# Patient Record
Sex: FEMALE | Race: ASIAN | NOT HISPANIC OR LATINO
[De-identification: names, ages, dates, MRNs, and addresses within clinical notes are randomized per-mention and may not be internally consistent; named-entity substitution may affect disease eponyms.]

---

## 2017-07-19 ENCOUNTER — APPOINTMENT (OUTPATIENT)
Dept: ORTHOPEDIC SURGERY | Facility: CLINIC | Age: 57
End: 2017-07-19

## 2017-07-19 VITALS
BODY MASS INDEX: 24.27 KG/M2 | SYSTOLIC BLOOD PRESSURE: 146 MMHG | HEART RATE: 51 BPM | WEIGHT: 151 LBS | DIASTOLIC BLOOD PRESSURE: 95 MMHG | HEIGHT: 66 IN

## 2017-07-19 DIAGNOSIS — M23.91 UNSPECIFIED INTERNAL DERANGEMENT OF RIGHT KNEE: ICD-10-CM

## 2017-07-19 DIAGNOSIS — M22.2X2 PATELLOFEMORAL DISORDERS, RIGHT KNEE: ICD-10-CM

## 2017-07-19 DIAGNOSIS — M22.2X1 PATELLOFEMORAL DISORDERS, RIGHT KNEE: ICD-10-CM

## 2017-07-19 RX ORDER — LOPERAMIDE HCL 2 MG
CAPSULE ORAL
Refills: 0 | Status: ACTIVE | COMMUNITY

## 2017-07-19 RX ORDER — FAMOTIDINE 40 MG/1
TABLET, FILM COATED ORAL
Refills: 0 | Status: ACTIVE | COMMUNITY

## 2017-07-19 RX ORDER — ASCORBIC ACID 500 MG
TABLET ORAL
Refills: 0 | Status: ACTIVE | COMMUNITY

## 2019-08-29 ENCOUNTER — APPOINTMENT (OUTPATIENT)
Dept: OBGYN | Facility: CLINIC | Age: 59
End: 2019-08-29
Payer: COMMERCIAL

## 2019-08-29 ENCOUNTER — RESULT REVIEW (OUTPATIENT)
Age: 59
End: 2019-08-29

## 2019-08-29 PROCEDURE — 99386 PREV VISIT NEW AGE 40-64: CPT

## 2019-10-05 ENCOUNTER — APPOINTMENT (OUTPATIENT)
Dept: ULTRASOUND IMAGING | Facility: CLINIC | Age: 59
End: 2019-10-05
Payer: COMMERCIAL

## 2019-10-05 ENCOUNTER — OUTPATIENT (OUTPATIENT)
Dept: OUTPATIENT SERVICES | Facility: HOSPITAL | Age: 59
LOS: 1 days | End: 2019-10-05
Payer: COMMERCIAL

## 2019-10-05 ENCOUNTER — APPOINTMENT (OUTPATIENT)
Dept: MAMMOGRAPHY | Facility: CLINIC | Age: 59
End: 2019-10-05
Payer: COMMERCIAL

## 2019-10-05 DIAGNOSIS — Z00.8 ENCOUNTER FOR OTHER GENERAL EXAMINATION: ICD-10-CM

## 2019-10-05 PROCEDURE — 76641 ULTRASOUND BREAST COMPLETE: CPT

## 2019-10-05 PROCEDURE — 77063 BREAST TOMOSYNTHESIS BI: CPT

## 2019-10-05 PROCEDURE — 77067 SCR MAMMO BI INCL CAD: CPT | Mod: 26

## 2019-10-05 PROCEDURE — 76641 ULTRASOUND BREAST COMPLETE: CPT | Mod: 26,LT

## 2019-10-05 PROCEDURE — 77067 SCR MAMMO BI INCL CAD: CPT

## 2019-10-05 PROCEDURE — 77063 BREAST TOMOSYNTHESIS BI: CPT | Mod: 26

## 2019-10-05 PROCEDURE — 76641 ULTRASOUND BREAST COMPLETE: CPT | Mod: 26,RT

## 2019-12-28 ENCOUNTER — OUTPATIENT (OUTPATIENT)
Dept: OUTPATIENT SERVICES | Facility: HOSPITAL | Age: 59
LOS: 1 days | End: 2019-12-28
Payer: COMMERCIAL

## 2019-12-28 ENCOUNTER — APPOINTMENT (OUTPATIENT)
Dept: CT IMAGING | Facility: CLINIC | Age: 59
End: 2019-12-28
Payer: COMMERCIAL

## 2019-12-28 DIAGNOSIS — Z00.8 ENCOUNTER FOR OTHER GENERAL EXAMINATION: ICD-10-CM

## 2019-12-28 PROCEDURE — 70491 CT SOFT TISSUE NECK W/DYE: CPT | Mod: 26

## 2019-12-28 PROCEDURE — 70491 CT SOFT TISSUE NECK W/DYE: CPT

## 2020-01-18 ENCOUNTER — APPOINTMENT (OUTPATIENT)
Dept: MRI IMAGING | Facility: CLINIC | Age: 60
End: 2020-01-18

## 2020-02-15 ENCOUNTER — APPOINTMENT (OUTPATIENT)
Dept: MRI IMAGING | Facility: HOSPITAL | Age: 60
End: 2020-02-15

## 2021-01-06 ENCOUNTER — TRANSCRIPTION ENCOUNTER (OUTPATIENT)
Age: 61
End: 2021-01-06

## 2021-02-05 ENCOUNTER — TRANSCRIPTION ENCOUNTER (OUTPATIENT)
Age: 61
End: 2021-02-05

## 2021-05-14 ENCOUNTER — APPOINTMENT (OUTPATIENT)
Dept: ORTHOPEDIC SURGERY | Facility: CLINIC | Age: 61
End: 2021-05-14
Payer: COMMERCIAL

## 2021-05-14 VITALS
HEART RATE: 52 BPM | SYSTOLIC BLOOD PRESSURE: 114 MMHG | BODY MASS INDEX: 25.18 KG/M2 | WEIGHT: 156 LBS | DIASTOLIC BLOOD PRESSURE: 76 MMHG | TEMPERATURE: 97.2 F

## 2021-05-14 DIAGNOSIS — Z86.69 PERSONAL HISTORY OF OTHER DISEASES OF THE NERVOUS SYSTEM AND SENSE ORGANS: ICD-10-CM

## 2021-05-14 DIAGNOSIS — Z80.1 FAMILY HISTORY OF MALIGNANT NEOPLASM OF TRACHEA, BRONCHUS AND LUNG: ICD-10-CM

## 2021-05-14 DIAGNOSIS — Z80.41 FAMILY HISTORY OF MALIGNANT NEOPLASM OF OVARY: ICD-10-CM

## 2021-05-14 DIAGNOSIS — Z87.39 PERSONAL HISTORY OF OTHER DISEASES OF THE MUSCULOSKELETAL SYSTEM AND CONNECTIVE TISSUE: ICD-10-CM

## 2021-05-14 PROCEDURE — 72050 X-RAY EXAM NECK SPINE 4/5VWS: CPT

## 2021-05-14 PROCEDURE — 73030 X-RAY EXAM OF SHOULDER: CPT | Mod: RT

## 2021-05-14 PROCEDURE — 20610 DRAIN/INJ JOINT/BURSA W/O US: CPT | Mod: RT

## 2021-05-14 PROCEDURE — S0020: CPT

## 2021-05-14 PROCEDURE — 99204 OFFICE O/P NEW MOD 45 MIN: CPT | Mod: 25

## 2021-05-14 PROCEDURE — 99072 ADDL SUPL MATRL&STAF TM PHE: CPT

## 2021-05-14 RX ORDER — BUPIVACAINE HCL/PF 2.5 MG/ML
0.25 VIAL (ML) INJECTION
Qty: 0 | Refills: 0 | Status: COMPLETED | OUTPATIENT
Start: 2021-05-14

## 2021-05-14 RX ORDER — METHYLPRED ACET/NACL,ISO-OS/PF 40 MG/ML
40 VIAL (ML) INJECTION
Qty: 1 | Refills: 0 | Status: COMPLETED | OUTPATIENT
Start: 2021-05-14

## 2021-05-14 RX ORDER — OMEPRAZOLE 40 MG/1
40 CAPSULE, DELAYED RELEASE ORAL
Refills: 0 | Status: ACTIVE | COMMUNITY

## 2021-05-14 RX ORDER — MONTELUKAST 10 MG/1
10 TABLET, FILM COATED ORAL
Refills: 0 | Status: ACTIVE | COMMUNITY

## 2021-05-14 RX ORDER — LIDOCAINE HYDROCHLORIDE 10 MG/ML
1 INJECTION, SOLUTION INFILTRATION; PERINEURAL
Qty: 0 | Refills: 0 | Status: COMPLETED | OUTPATIENT
Start: 2021-05-14

## 2021-05-14 RX ADMIN — LIDOCAINE HYDROCHLORIDE 3 %: 10 INJECTION, SOLUTION INFILTRATION; PERINEURAL at 00:00

## 2021-05-14 RX ADMIN — BUPIVACAINE HYDROCHLORIDE 3 %: 2.5 INJECTION, SOLUTION INFILTRATION; PERINEURAL at 00:00

## 2021-05-14 RX ADMIN — METHYLPREDNISOLONE ACETATE 1 MG/ML: 40 INJECTION, SUSPENSION INTRALESIONAL; INTRAMUSCULAR; INTRASYNOVIAL; SOFT TISSUE at 00:00

## 2021-05-19 NOTE — PHYSICAL EXAM
[Normal] : normal [UE] : Sensory: Intact in bilateral upper extremities [Bicep] : biceps 2+ and symmetric bilaterally [B.R.] : biceps 2+ and symmetric bilaterally [Tricep] : triceps 2+ and symmetric bilaterally [Rad] : radial 2+ and symmetric bilaterally [de-identified] : 4 views cervical spine demonstrate no significant scoliosis.  Normal cervical lordosis with some straightening of the mid cervical spine.  No dynamic instability between flexion-extension.  Minimal loss of disc height posteriorly at the C5-6 and C6-7 levels.  No acute fractures.\par \par 3 views of the right shoulder demonstrate preserved glenohumeral joint.  Type I acromion.  No significant degenerative changes.  No acute fractures.\par ______\par EXAM: CT NECK SOFT TISSUE IC \par \par PROCEDURE DATE: 12/28/2019 \par \par INTERPRETATION: INDICATIONS: Dysphagia, first to liquids over the years, \par progressive to both liquids and solids, of looping vessel left-sided throat \par during swallowing, colon on tongue during swallowing, feels tightness during \par speaking \par \par TECHNIQUE: Axial images were obtained following the intravenous \par administration of contrast material. Sagittal and coronal reformatted images \par were obtained. 90 cc Omnipaque 350 were administered. 10 cc were discarded. \par \par COMPARISON EXAMINATION: None. \par \par FINDINGS: \par \par AERODIGESTIVE TRACT AND THYROID GLAND: \par \par A radiographic skin marker at the left inferior neck at C4-5, medially there \par is a slightly heterogeneous left thyroid lobe, (3:142), ultrasound may \par better assess. Just slightly superiorly and posterior, there is also a \par posterior displaced left greater than right hyoid bone, on the left \par approximating the anterior left longus coli musculature, (3:114). \par \par Mildly prominent left greater the right lingual tonsils with asymmetric \par extension into the vallecula abutting the left ventral epiglottis, (3:115), \par correlate with visual inspection, aryepiglottic folds, piriform sinuses,, \par false and true cords are unremarkable. A swallow study may better assess \par dysphagia in this patient. \par \par LYMPH NODES: No adenopathy. \par \par PAROTID GLANDS: Unremarkable. \par \par SUBMANDIBULAR GLANDS: Unremarkable. \par \par VISUALIZED PARANASAL SINUSES: Clear. \par \par VISUALIZED TYMPANOMASTOID CAVITIES: Clear. \par \par BONES: Unremarkable alignment with mild spondylitic changes in cerebral \par spine, no evidence for any severe canal compromise. \par \par MISCELLANEOUS: Biapical parenchymal scarring, (3:199). Brain parenchyma is \par unremarkable, no acute hemorrhage or midline shift. Orbits unremarkable, \par contrast enhancement is in the carotid arteries and jugular veins indicative \par of patency. \par \par IMPRESSION: \par \par Marker at C4-5 left neck, heterogeneous left thyroid, ultrasound may better \par assess. Posterior displaced left hyoid bone, prominent left lingual tonsils \par extending to the vallecula, a swallow study may better assess dysphagia \par \par SAMINA NUNEZ M.D., RADIOLOGY FELLOW \par This document has been electronically signed. \par HARSH PEARCE M.D., ATTENDING RADIOLOGIST \par This document has been electronically signed. Dec 31 2019 11:49AM  [Poor Appearance] : well-appearing [Acute Distress] : not in acute distress [Obese] : not obese [Disorientation] : oriented x 3 [Oglesby's Sign] : negative Oglesby's sign [SLR] : negative straight leg raise [FreeTextEntry2] : The pt is awake, alert and oriented to self, place and time, is comfortable and in no acute distress. Gait evaluation reveals a narrow based, non-ataxic, non-antalgic gait. Negative Romberg sign noted. Can heel and toe walk without difficulty. Inspection of the neck, back and upper extremities bilaterally reveals no rashes or ecchymotic lesions. There is no obvious abnormal spinal curvature in the sagittal and coronal planes. \par Significant stiffness is noted with left shoulder active and passive range of motion. Forward elevation is to 90° external rotation is 25° and internal rotation is to the sacrum. On the right side forward elevation is 160°, external rotation is 60° and internal rotation is to T12. Unable to assess for Neers or Cee sign on the left. No crepitus or instability noted with range of motion of bilateral upper extremities. No joint laxity noted in the upper and lower extremities bilaterally. No atrophy or abnormal movements noted in the upper or lower extremities. No tenderness over the cervical, thoracic, lumbar spine or in the paraspinal, or upper and lower extremity musculature. There is no swelling noted in the upper or lower extremities bilaterally. No cervical lymphadenopathy noted anteriorly.\par Cervical spine range of motion is pain free. Forward flexion is neck to chin, extension 30 degrees, rotation laterally 40 degrees bilaterally. Negative Spurling's sign bilaterally.\par 5/5 motor strength at the deltoids, biceps, triceps, wrist extensors, flexors and hand intrinsics bilaterally. No gross sensory deficits to light touch bilaterally. Reflexes are symmetric +2 at biceps, triceps, and brachioradialis bilaterally. There are 2+ radial pulses bilaterally. Negative Babinski sign and no clonus bilaterally in the upper or lower extremities. [de-identified] : Positive Neer and Cee sign on the right

## 2021-05-19 NOTE — HISTORY OF PRESENT ILLNESS
[Worsening] : worsening [7] : a current pain level of 7/10 [Constant] : ~He/She~ states the symptoms seem to be constant [de-identified] : Patient presents today for right shoulder pain.  Patient states she has had the pain for the past 2 years but worsening x 6 months.  Has not had any treatment due to covid 19 fear.  Patient also complains of neck pain on both sides and states she does have some pain to left shoulder as well but is only here for right shoulder.  Denies numbness and tingling at this time.  No weakness.  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 [de-identified] : laying on right side, movement

## 2021-05-19 NOTE — DISCUSSION/SUMMARY
[Medication Risks Reviewed] : Medication risks reviewed [de-identified] : The patient has symptoms suggestive of rotator cuff tendinitis.  Offered her an injection of subacromial corticosteroids and she wanted to proceed.  Under sterile conditions 40 mg of Depo-Medrol mixed with a 6 cc solution of 1% lidocaine 0.25% Marcaine without epinephrine was injected into the right shoulder subacromial space with more than 80% relief of symptoms 5 minutes after the injection.  To assess for labral tear given her clicking and symptomatic pathology in the right shoulder recommended MR arthrogram as well.  Prescribed her physical therapy.  Further treatment options can be discussed based on response to the injection and MRI findings.\par \par The patient was educated regarding their condition, treatment options as well as prescribed course of treatment. \par Risks and benefits as well as alternatives to the proposed treatment were also provided to the patient \par They were given the opportunity to have all their questions answered to their satisfaction.\par \par Vital signs were reviewed with the patient and the patient was instructed to followup with their primary care provider for further management.\par \par Healthy lifestyle recommendations were also made including a tobacco free lifestyle, proper diet, and weight control.

## 2021-06-08 ENCOUNTER — APPOINTMENT (OUTPATIENT)
Dept: MRI IMAGING | Facility: CLINIC | Age: 61
End: 2021-06-08
Payer: COMMERCIAL

## 2021-06-08 ENCOUNTER — NON-APPOINTMENT (OUTPATIENT)
Age: 61
End: 2021-06-08

## 2021-06-08 ENCOUNTER — OUTPATIENT (OUTPATIENT)
Dept: OUTPATIENT SERVICES | Facility: HOSPITAL | Age: 61
LOS: 1 days | End: 2021-06-08
Payer: COMMERCIAL

## 2021-06-08 ENCOUNTER — APPOINTMENT (OUTPATIENT)
Dept: RADIOLOGY | Facility: CLINIC | Age: 61
End: 2021-06-08
Payer: COMMERCIAL

## 2021-06-08 DIAGNOSIS — S43.431A SUPERIOR GLENOID LABRUM LESION OF RIGHT SHOULDER, INITIAL ENCOUNTER: ICD-10-CM

## 2021-06-08 PROCEDURE — 73221 MRI JOINT UPR EXTREM W/O DYE: CPT

## 2021-06-08 PROCEDURE — 73221 MRI JOINT UPR EXTREM W/O DYE: CPT | Mod: 26,RT

## 2021-06-14 ENCOUNTER — APPOINTMENT (OUTPATIENT)
Dept: ORTHOPEDIC SURGERY | Facility: CLINIC | Age: 61
End: 2021-06-14
Payer: COMMERCIAL

## 2021-06-14 VITALS — HEART RATE: 57 BPM | DIASTOLIC BLOOD PRESSURE: 72 MMHG | TEMPERATURE: 97.2 F | SYSTOLIC BLOOD PRESSURE: 107 MMHG

## 2021-06-14 PROCEDURE — 99214 OFFICE O/P EST MOD 30 MIN: CPT

## 2021-06-14 PROCEDURE — 99072 ADDL SUPL MATRL&STAF TM PHE: CPT

## 2021-08-30 ENCOUNTER — APPOINTMENT (OUTPATIENT)
Dept: ORTHOPEDIC SURGERY | Facility: CLINIC | Age: 61
End: 2021-08-30

## 2021-09-08 ENCOUNTER — APPOINTMENT (OUTPATIENT)
Dept: ORTHOPEDIC SURGERY | Facility: CLINIC | Age: 61
End: 2021-09-08

## 2021-10-13 ENCOUNTER — NON-APPOINTMENT (OUTPATIENT)
Age: 61
End: 2021-10-13

## 2021-10-13 ENCOUNTER — APPOINTMENT (OUTPATIENT)
Dept: ORTHOPEDIC SURGERY | Facility: CLINIC | Age: 61
End: 2021-10-13
Payer: COMMERCIAL

## 2021-10-13 VITALS
WEIGHT: 154 LBS | DIASTOLIC BLOOD PRESSURE: 80 MMHG | BODY MASS INDEX: 24.86 KG/M2 | HEART RATE: 64 BPM | SYSTOLIC BLOOD PRESSURE: 125 MMHG

## 2021-10-13 DIAGNOSIS — S43.431A SUPERIOR GLENOID LABRUM LESION OF RIGHT SHOULDER, INITIAL ENCOUNTER: ICD-10-CM

## 2021-10-13 DIAGNOSIS — M17.10 UNILATERAL PRIMARY OSTEOARTHRITIS, UNSPECIFIED KNEE: ICD-10-CM

## 2021-10-13 PROCEDURE — 20610 DRAIN/INJ JOINT/BURSA W/O US: CPT | Mod: RT

## 2021-10-13 PROCEDURE — 99214 OFFICE O/P EST MOD 30 MIN: CPT | Mod: 25

## 2021-10-13 PROCEDURE — S0020: CPT

## 2021-10-13 RX ORDER — LIDOCAINE HYDROCHLORIDE 10 MG/ML
1 INJECTION, SOLUTION INFILTRATION; PERINEURAL
Refills: 0 | Status: COMPLETED | OUTPATIENT
Start: 2021-10-13

## 2021-10-13 RX ORDER — METHYLPRED ACET/NACL,ISO-OS/PF 40 MG/ML
40 VIAL (ML) INJECTION
Qty: 1 | Refills: 0 | Status: COMPLETED | OUTPATIENT
Start: 2021-10-13

## 2021-10-13 RX ORDER — BUPIVACAINE HCL/PF 2.5 MG/ML
0.25 VIAL (ML) INJECTION
Qty: 0 | Refills: 0 | Status: COMPLETED | OUTPATIENT
Start: 2021-10-13

## 2021-10-13 RX ADMIN — LIDOCAINE HYDROCHLORIDE 3 %: 10 INJECTION, SOLUTION INFILTRATION; PERINEURAL at 00:00

## 2021-10-13 RX ADMIN — BUPIVACAINE HYDROCHLORIDE 3 %: 2.5 INJECTION, SOLUTION INFILTRATION; PERINEURAL at 00:00

## 2021-10-13 RX ADMIN — METHYLPREDNISOLONE ACETATE 1 MG/ML: 40 INJECTION, SUSPENSION INTRALESIONAL; INTRAMUSCULAR; INTRASYNOVIAL; SOFT TISSUE at 00:00

## 2021-10-25 NOTE — PHYSICAL EXAM
[Normal] : normal [UE] : Sensory: Intact in bilateral upper extremities [Bicep] : biceps 2+ and symmetric bilaterally [B.R.] : biceps 2+ and symmetric bilaterally [Tricep] : triceps 2+ and symmetric bilaterally [Rad] : radial 2+ and symmetric bilaterally [Poor Appearance] : well-appearing [Acute Distress] : not in acute distress [Obese] : not obese [Disorientation] : oriented x 3 [Oglesby's Sign] : negative Oglesby's sign [SLR] : negative straight leg raise [FreeTextEntry2] : The pt is awake, alert and oriented to self, place and time, is comfortable and in no acute distress. Gait evaluation reveals a narrow based, non-ataxic, non-antalgic gait. Negative Romberg sign noted. Can heel and toe walk without difficulty. Inspection of the neck, back and upper extremities bilaterally reveals no rashes or ecchymotic lesions. There is no obvious abnormal spinal curvature in the sagittal and coronal planes. \par Significant stiffness is noted with left shoulder active and passive range of motion. Forward elevation is to 90° external rotation is 25° and internal rotation is to the sacrum. On the right side forward elevation is 160°, external rotation is 60° and internal rotation is to T12. Unable to assess for Neers or Cee sign on the left. No crepitus or instability noted with range of motion of bilateral upper extremities. No joint laxity noted in the upper and lower extremities bilaterally. No atrophy or abnormal movements noted in the upper or lower extremities. No tenderness over the cervical, thoracic, lumbar spine or in the paraspinal, or upper and lower extremity musculature. There is no swelling noted in the upper or lower extremities bilaterally. No cervical lymphadenopathy noted anteriorly.\par Cervical spine range of motion is pain free. Forward flexion is neck to chin, extension 30 degrees, rotation laterally 40 degrees bilaterally. Negative Spurling's sign bilaterally.\par 5/5 motor strength at the deltoids, biceps, triceps, wrist extensors, flexors and hand intrinsics bilaterally. No gross sensory deficits to light touch bilaterally. Reflexes are symmetric +2 at biceps, triceps, and brachioradialis bilaterally. There are 2+ radial pulses bilaterally. Negative Babinski sign and no clonus bilaterally in the upper or lower extremities. [de-identified] : Positive  Neer and Cee sign on the right

## 2021-10-25 NOTE — DISCUSSION/SUMMARY
[Medication Risks Reviewed] : Medication risks reviewed [de-identified] : The patient has symptoms of rotator cuff tendinitis on the right side.  She has had a left shoulder injection with significant relief.  At this time she would like an injection on the right side.  She under sterile conditions 40 mg of Depo-Medrol mixed with a 6 cc solution of 1% lidocaine and 0.25% of peak and was injected into the right shoulder subacromial space without incident.  She had more than 80% relief of symptoms 5 minutes after the injection.  Physical therapy was prescribed for her.\par \par If her symptoms persist or worsen MRI of the affected shoulder may be considered as well and follow-up evaluation by the shoulder surgeon in this office.

## 2021-10-25 NOTE — HISTORY OF PRESENT ILLNESS
[3] : a current pain level of 3/10 [Rest] : relieved by rest [de-identified] : Patient presents today with complaints of bilateral shoulder pain which is worse on the right side.  Patient is unable to reach behind self.  Patient has decreased ROM to bilateral shoulders.  Denies complaints of numbness or tingling.  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  Patient states she lifts and turns her mother often, which contributes to the pain.\par Has been taking care of her terminally ill mother.\par Has not had any PT yet. Has gastritis. [de-identified] : lifting arm

## 2021-12-06 ENCOUNTER — APPOINTMENT (OUTPATIENT)
Dept: ORTHOPEDIC SURGERY | Facility: CLINIC | Age: 61
End: 2021-12-06
Payer: COMMERCIAL

## 2021-12-06 VITALS
DIASTOLIC BLOOD PRESSURE: 81 MMHG | WEIGHT: 152 LBS | BODY MASS INDEX: 24.53 KG/M2 | HEART RATE: 58 BPM | SYSTOLIC BLOOD PRESSURE: 120 MMHG

## 2021-12-06 DIAGNOSIS — S43.431D SUPERIOR GLENOID LABRUM LESION OF RIGHT SHOULDER, SUBSEQUENT ENCOUNTER: ICD-10-CM

## 2021-12-06 DIAGNOSIS — M75.42 IMPINGEMENT SYNDROME OF LEFT SHOULDER: ICD-10-CM

## 2021-12-06 DIAGNOSIS — M75.81 OTHER SHOULDER LESIONS, RIGHT SHOULDER: ICD-10-CM

## 2021-12-06 PROCEDURE — 99213 OFFICE O/P EST LOW 20 MIN: CPT

## 2021-12-06 RX ORDER — CELECOXIB 100 MG/1
100 CAPSULE ORAL TWICE DAILY
Qty: 30 | Refills: 0 | Status: ACTIVE | COMMUNITY
Start: 2021-12-06 | End: 1900-01-01

## 2021-12-06 RX ORDER — DICLOFENAC SODIUM AND MISOPROSTOL 50; 200 MG/1; UG/1
50-0.2 TABLET, DELAYED RELEASE ORAL
Qty: 30 | Refills: 2 | Status: ACTIVE | COMMUNITY
Start: 2021-12-06 | End: 1900-01-01

## 2021-12-06 RX ORDER — TIMOLOL MALEATE 2.5 MG/ML
0.25 SOLUTION/ DROPS OPHTHALMIC
Qty: 20 | Refills: 0 | Status: ACTIVE | COMMUNITY
Start: 2020-11-17

## 2021-12-08 NOTE — PHYSICAL EXAM
[Normal] : normal [UE] : Sensory: Intact in bilateral upper extremities [Bicep] : biceps 2+ and symmetric bilaterally [B.R.] : biceps 2+ and symmetric bilaterally [Tricep] : triceps 2+ and symmetric bilaterally [Rad] : radial 2+ and symmetric bilaterally [Poor Appearance] : well-appearing [Acute Distress] : not in acute distress [Obese] : not obese [Disorientation] : oriented x 3 [Oglesby's Sign] : negative Oglesby's sign [SLR] : negative straight leg raise [FreeTextEntry2] : The pt is awake, alert and oriented to self, place and time, is comfortable and in no acute distress. Gait evaluation reveals a narrow based, non-ataxic, non-antalgic gait. Negative Romberg sign noted. Can heel and toe walk without difficulty. Inspection of the neck, back and upper extremities bilaterally reveals no rashes or ecchymotic lesions. There is no obvious abnormal spinal curvature in the sagittal and coronal planes. \par Forward elevation is to 90° external rotation is 25° and internal rotation is to the sacrum. On the right side forward elevation is 100°, external rotation is 30° and internal rotation is to side. Unable to assess for Neers or Cee sign on the left. No crepitus or instability noted with range of motion of bilateral upper extremities. No joint laxity noted in the upper and lower extremities bilaterally. No atrophy or abnormal movements noted in the upper or lower extremities. No tenderness over the cervical, thoracic, lumbar spine or in the paraspinal, or upper and lower extremity musculature. There is no swelling noted in the upper or lower extremities bilaterally. No cervical lymphadenopathy noted anteriorly.\par Cervical spine range of motion is pain free. Forward flexion is neck to chin, extension 30 degrees, rotation laterally 40 degrees bilaterally. Negative Spurling's sign bilaterally.\par 5/5 motor strength at the deltoids, biceps, triceps, wrist extensors, flexors and hand intrinsics bilaterally. No gross sensory deficits to light touch bilaterally. Reflexes are symmetric +2 at biceps, triceps, and brachioradialis bilaterally. There are 2+ radial pulses bilaterally. Negative Babinski sign and no clonus bilaterally in the upper or lower extremities. [de-identified] : Positive  Neer and Cee sign on the right

## 2021-12-08 NOTE — HISTORY OF PRESENT ILLNESS
[Stable] : stable [Intermit.] : ~He/She~ states the symptoms seem to be intermittent [Lifting] : worsened by lifting [Rest] : relieved by rest [___ yrs] : [unfilled] year(s) ago [Acetaminophen] : relieved by acetaminophen [de-identified] : Patient presents for two month followup for bilateral shoulder pain.  Patient states the pain is worse on the right side and is unable to place arm behind back without pain.  Patient also is unable to lift arm over head.  Patient states she is helping with her mother and is picking mother up and moving her around.  Patient states the cortisone injection helped but she is still experiencing pain.  Patient denies complaints of numbness or tingling at this time.  Patient states the pain is 2/10 at rest but increases with movement.\par Patient has not been able to attend physical therapy due to trying to take care of her mother.

## 2021-12-08 NOTE — DISCUSSION/SUMMARY
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  At this time prescribed her a trial of Celebrex versus diclofenac–misoprostol which she may trial.  She is actively taking care of her elderly sick mother and cannot participate in physical therapy.  Home health rehab program was recommended for her and a prescription provided.  She can follow-up with the shoulder surgeon in the practice for further management of her condition.  She is not interested in another injection today.\par \par The patient was educated regarding their condition, treatment options as well as prescribed course of treatment. \par Risks and benefits as well as alternatives to the proposed treatment were also provided to the patient \par They were given the opportunity to have all their questions answered to their satisfaction.\par \par Vital signs were reviewed with the patient and the patient was instructed to followup with their primary care provider for further management.\par \par Healthy lifestyle recommendations were also made including a tobacco free lifestyle, proper diet, and weight control.

## 2022-01-04 ENCOUNTER — TRANSCRIPTION ENCOUNTER (OUTPATIENT)
Age: 62
End: 2022-01-04